# Patient Record
Sex: FEMALE | Race: WHITE | NOT HISPANIC OR LATINO | URBAN - METROPOLITAN AREA
[De-identification: names, ages, dates, MRNs, and addresses within clinical notes are randomized per-mention and may not be internally consistent; named-entity substitution may affect disease eponyms.]

---

## 2023-04-04 ENCOUNTER — TELEPHONE (OUTPATIENT)
Dept: TRANSPLANT | Facility: CLINIC | Age: 33
End: 2023-04-04

## 2023-04-04 NOTE — TELEPHONE ENCOUNTER
"Received email from Delmy Vegas stating that she is interested in donating to her friend, CRISTHIAN Lee. Screening questions sent & answers received (see below). Transplant donor referral episode opened.     : [birth year]  HT: 5'5"  WT: 138  BMI: 22.9  AGE: 33  EMAIL: [email or web address]    Relation/Recipient: Friend, Nuria Lee    HH sent on 22.       -----------------------------      LivingLiverTrans  ?  ?  ?  ?  ?    To:Delmy Vegas <[email or web address]>  Tue 2023 4:48 PM    Living Donor manual.pdf  328 KB  ?    LI Living Donor Health History.pdf  388 KB  ?    948 Living Liver Donor Informed Consent for Evaluation revised [email or web address]  90 KB  ?  3 attachments (807 KB)  ?  Save all to [facility] - [facility]  ?  Download all  Excellent. That info looks great. Thank you for your quick response!      Please find attached the following documents: a health history, the donor manual, the consent for evaluation. I encourage you to read over all of them & call with any questions your might have, but the only one I need returned is the Living Donor Health History. If we move forward with evaluation, you will sign the consent at that time.     Once completed, you may scan the health history form and return to this email address OR you may fax it to [phone], ATTN: Fede Gonzalez RN. As soon as I receive your health history, I will share it with our living donor MDs for review. They will let me know if we can proceed with a living liver donor evaluation.     Evaluation consists of 2-3 days of testing and consults at the [facility] on [facility]. Its very similar to the testing and consults done in a transplant recipient evaluation, and like the recipient, you will need to bring a caregiver to your evaluation (separate from your recipients caregiver). Once all test results are received and reviewed (including an imaging review process with our team and a team of " radiologists), your case is presented to the selection committee on a subsequent Wednesday. If you approved for living donor surgery, we will work with you and your recipient on an acceptable surgery date.      If you would like to watch an educational video about the benefits of living donation, please follow this link: [email or web address]     Also, here is the link for the Living Donor Assistance website. Its worth looking into for potential grants to defray gas/hotel costs if we do move forward with a donor evaluation. There are also a number of other resources available. https://www.livingdonorassistance.org/     I look forward to working with you in the future. Feel free to reach out with any questions you might have.       KADEN Marsh, RN, [facility]  Living Liver Donor Coordinator  Senior Clinical Liver Transplant Coordinator  [facility] [place]  Phone: [phone]  Fax: [phone]           From: Delmy Vegas <[email or web address]>  Sent: Tuesday, April 4, 2023 4:34 PM  To: LivingLiverTrans <[email or web address]>  Subject: [EXTERNAL] Re: Living Liver Donor      Hi Fede,    Thanks for getting back to me. Here's my info. Looking forward to speaking with you more.      1. [birth year]  2. 5'5  3. 138 lbs  4. Migraines  5. O+  6. Yes  7. Yes    Also, No coercion, payment etc. Just want to help if I can.    Delmy    On Tue, Apr 4, 2023 at 12:11 PM LivingLiverTrans <[email or web address]> wrote:  I can help you with that, Delmy. I will need your  to submit a separate email to me, though. Let's start with a few questions for you:     1.        What is your date of birth?  2.        Height?  3.        Weight?  4.        Any health issues (present or past)?  5.        What is your blood type?  6.        Are you able to take 6-8 weeks off to recover from surgery?  7.        Do you have health insurance?     The donor evaluation process is  a multi-step process starting with this screening. If everything screens OK, Ill have you fill out our detailed health history form to be reviewed by our team & then potentially schedule an evaluation if all looks well.      Donors must be between the ages of 18-50 (we sometimes consider folks between 50 & 55, but they must be in excellent health & our process is much more selective at that point). Donors must be willing and able to volunteer to be a donor without pressure, coercion, or payment. Donor will need to present to our clinic at main [facility] ([place]) for all evaluation testing & must be up to date with all age-appropriate cancer screening (pap smear, mammogram, PSA, colonoscopy, etc.). Testing usually takes two days & once the results are in, I present donor cases with the living donor team to the same selection committee that recipient cases go before to determine if listing for transplant is appropriate. If donors are approved, a date is chosen & surgery is scheduled. Surgery is roughly 8 hours of the day for the donor but can be shorter or longer. Donors should expect to spend a day post-op in ICU, 3-5 days on the stepdown unit & then ~2 weeks in the [place] area if they live more than an hour away. We support donors taking at least 6 weeks off for recovery as this is major surgery, and weight-lifting limits will be set low (~10lbs) for at least 6 months. Donors must agree to follow-up for 2 years (phone calls & labs) to remain compliant with protocols.     Each donor must have a caregiver plan separate from the recipients caregiver plan. The caregiver will need to attend the evaluation with the potential donor & be available to the team once donor is approved, be with the donor on surgery day & also stay with the donor post-op for a period of time.      If a recipient has multiple potential donors, the team & the donors will determine who will be worked up first as we are only able to  work up one donor at a time. Recipients must have donor coverage OR their insurance company must be open to the possibility of an agreement to work up a potential donor if coverage is not in place. (We will investigate this for all recipients as necessary.) The center cannot evaluate a donor or order testing until recipients are listed for transplant, but we can screen and receive health histories to review in the meantime.      I have attached a PDF of our donor manual to explain the process in detail. Please feel free to call [phone] (ask for the living LIVER donor coordinator or mention my name - Fede Gonzalez) with any questions you might have.    Fede Gonzalez, KATIEN, RN, [facility]  Living Liver Donor Coordinator  Senior Clinical Liver Transplant Coordinator  [facility] [place]  Phone: [phone]  Fax: [phone]           From: Delmy Vegas <[email or web address]>  Sent: Tuesday, April 4, 2023 1:46 AM  To: [email or web address] <[email or web address]>; [email or web address]>  Subject: [EXTERNAL] Living Liver Donor      Hello,    Both myself and my  are interested in starting the interview process to be screened as potential living liver donors for Nuriapradip Lee. How do we begin this process?    Thank you!    Katie Vegas    ?  Reply  ?  Forward

## 2023-04-05 ENCOUNTER — TELEPHONE (OUTPATIENT)
Dept: TRANSPLANT | Facility: CLINIC | Age: 33
End: 2023-04-05

## 2023-04-05 NOTE — TELEPHONE ENCOUNTER
Received health history form via email. History form and risk assessment uploaded to pt's media files.

## 2023-04-06 VITALS — BODY MASS INDEX: 22.99 KG/M2 | HEIGHT: 65 IN | WEIGHT: 138 LBS

## 2023-04-06 RX ORDER — FLUOXETINE 10 MG/1
5 TABLET ORAL DAILY
COMMUNITY

## 2023-04-06 RX ORDER — SUMATRIPTAN SUCCINATE 25 MG/1
25 TABLET ORAL
COMMUNITY

## 2023-04-06 RX ORDER — GABAPENTIN 300 MG/1
300 CAPSULE ORAL
COMMUNITY

## 2023-04-27 ENCOUNTER — TELEPHONE (OUTPATIENT)
Dept: TRANSPLANT | Facility: CLINIC | Age: 33
End: 2023-04-27

## 2023-04-27 NOTE — TELEPHONE ENCOUNTER
Email update sent re: donor case to Delmy. HH approved. Delmy can be considered as a potential donor for Nuria.    --------------------------------------------------    Dear Delmy,    I am reaching out to provide you with an update on your donor case for Nuria Lee.   The liver transplant team is still working on finalizing Ms. Lee' case. We apologize for the long wait, but we must be sure everything is in place prior to proceeding with any donor evaluations. While I cannot divulge any specific information due to privacy rules, I can tell you that we are working together with multiple entities/departments to coordinate her case.  In the meantime, I wanted to let you know that the living liver donor team has reviewed your health history form and we believe that you could potentially be a good donor for Ms. Lee. We have had a large number of people reach out to be screened for donation, and we are working on paring down the list to the most ideal candidates. Currently, you are on that list for consideration.  We are limited to evaluating one donor at a time due to insurance constraints. Once the case is finalized, we will look at our list of ideal candidates, you included, and choose the best donor candidate to start an evaluation. We are limited to evaluating one donor at a time due to insurance constraints.      I will keep you up to date as we know more about the situation, but in the meantime, I wanted to let you know where the case stands.   Thank you again for considering giving the gift of life. I look forward to working with you soon!      Fede Gonzalez, KATIEN, RN, [facility]  Living Liver Donor Coordinator  Senior Clinical Liver Transplant Coordinator  [facility] [place]  Phone: [phone]  Fax: [phone]

## 2023-05-25 ENCOUNTER — TELEPHONE (OUTPATIENT)
Dept: TRANSPLANT | Facility: CLINIC | Age: 33
End: 2023-05-25

## 2023-11-29 ENCOUNTER — APPOINTMENT (RX ONLY)
Dept: URBAN - METROPOLITAN AREA CLINIC 94 | Facility: CLINIC | Age: 33
Setting detail: DERMATOLOGY
End: 2023-11-29

## 2023-11-29 DIAGNOSIS — L70.0 ACNE VULGARIS: ICD-10-CM

## 2023-11-29 DIAGNOSIS — D18.0 HEMANGIOMA: ICD-10-CM

## 2023-11-29 DIAGNOSIS — Z80.8 FAMILY HISTORY OF MALIGNANT NEOPLASM OF OTHER ORGANS OR SYSTEMS: ICD-10-CM

## 2023-11-29 DIAGNOSIS — L81.4 OTHER MELANIN HYPERPIGMENTATION: ICD-10-CM

## 2023-11-29 DIAGNOSIS — L20.89 OTHER ATOPIC DERMATITIS: ICD-10-CM

## 2023-11-29 DIAGNOSIS — D22 MELANOCYTIC NEVI: ICD-10-CM

## 2023-11-29 DIAGNOSIS — L57.8 OTHER SKIN CHANGES DUE TO CHRONIC EXPOSURE TO NONIONIZING RADIATION: ICD-10-CM

## 2023-11-29 PROBLEM — D22.72 MELANOCYTIC NEVI OF LEFT LOWER LIMB, INCLUDING HIP: Status: ACTIVE | Noted: 2023-11-29

## 2023-11-29 PROBLEM — D18.01 HEMANGIOMA OF SKIN AND SUBCUTANEOUS TISSUE: Status: ACTIVE | Noted: 2023-11-29

## 2023-11-29 PROBLEM — D22.62 MELANOCYTIC NEVI OF LEFT UPPER LIMB, INCLUDING SHOULDER: Status: ACTIVE | Noted: 2023-11-29

## 2023-11-29 PROCEDURE — ? PRESCRIPTION

## 2023-11-29 PROCEDURE — 99214 OFFICE O/P EST MOD 30 MIN: CPT

## 2023-11-29 PROCEDURE — ? COUNSELING

## 2023-11-29 PROCEDURE — ? PRESCRIPTION MEDICATION MANAGEMENT

## 2023-11-29 RX ORDER — TRIAMCINOLONE ACETONIDE 1 MG/G
CREAM TOPICAL TWICE DAILY
Qty: 80 | Refills: 3 | Status: ERX | COMMUNITY
Start: 2023-11-29

## 2023-11-29 RX ADMIN — TRIAMCINOLONE ACETONIDE: 1 CREAM TOPICAL at 00:00

## 2023-11-29 ASSESSMENT — LOCATION DETAILED DESCRIPTION DERM
LOCATION DETAILED: MIDDLE STERNUM
LOCATION DETAILED: LEFT MEDIAL ZYGOMA
LOCATION DETAILED: LEFT ANTERIOR DISTAL THIGH
LOCATION DETAILED: RIGHT INFERIOR MEDIAL FOREHEAD
LOCATION DETAILED: LEFT SUPERIOR UPPER BACK
LOCATION DETAILED: STERNAL NOTCH
LOCATION DETAILED: RIGHT BUTTOCK
LOCATION DETAILED: RIGHT ANTERIOR PROXIMAL UPPER ARM
LOCATION DETAILED: LEFT POSTERIOR SHOULDER
LOCATION DETAILED: LEFT LATERAL PROXIMAL UPPER ARM

## 2023-11-29 ASSESSMENT — LOCATION SIMPLE DESCRIPTION DERM
LOCATION SIMPLE: LEFT UPPER BACK
LOCATION SIMPLE: LEFT THIGH
LOCATION SIMPLE: RIGHT BUTTOCK
LOCATION SIMPLE: RIGHT FOREHEAD
LOCATION SIMPLE: LEFT ZYGOMA
LOCATION SIMPLE: CHEST
LOCATION SIMPLE: RIGHT UPPER ARM
LOCATION SIMPLE: LEFT SHOULDER
LOCATION SIMPLE: LEFT UPPER ARM

## 2023-11-29 ASSESSMENT — LOCATION ZONE DERM
LOCATION ZONE: LEG
LOCATION ZONE: ARM
LOCATION ZONE: TRUNK
LOCATION ZONE: FACE

## 2023-11-29 NOTE — PROCEDURE: PRESCRIPTION MEDICATION MANAGEMENT
Initiate Treatment: triamcinolone acetonide 0.1 % topical cream Twice daily\\nQuantity: 80.0 g  Days Supply: 14\\nSig: Apply twice daily to chest x 1- 2 weeks on 2 weeks off for flares
Detail Level: Zone
Render In Strict Bullet Format?: No
Initiate Treatment: Written Rx given for Tretinoin 0.5% cream 20 grams 5 refills\\napply pea sized amount to entire face at bedtime as tolerated. can be drying and irritated.  not covered by insurance

## 2023-11-29 NOTE — PROCEDURE: COUNSELING
Detail Level: Detailed
Patient Specific Counseling (Will Not Stick From Patient To Patient): discussed removal is cosmetic at $250 for up to 12
Aklief Pregnancy And Lactation Text: It is unknown if this medication is safe to use during pregnancy. It is unknown if this medication is excreted in breast milk. Breastfeeding women should use the topical cream on the smallest area of the skin for the shortest time needed while breastfeeding. Do not apply to nipple and areola.
Tazorac Counseling:  Patient advised that medication is irritating and drying. Patient may need to apply sparingly and wash off after an hour before eventually leaving it on overnight. The patient verbalized understanding of the proper use and possible adverse effects of tazorac. All of the patient's questions and concerns were addressed.
Isotretinoin Counseling: Patient should get monthly blood tests, not donate blood, not drive at night if vision affected, not share medication, and not undergo elective surgery for 6 months after tx completed. Side effects reviewed, pt to contact office should one occur.
Birth Control Pills Pregnancy And Lactation Text: This medication should be avoided if pregnant and for the first 30 days post-partum.
Spironolactone Pregnancy And Lactation Text: This medication can cause feminization of the male fetus and should be avoided during pregnancy. The active metabolite is also found in breast milk.
Bactrim Pregnancy And Lactation Text: This medication is Pregnancy Category D and is known to cause fetal risk. It is also excreted in breast milk.
Minocycline Counseling: Patient advised regarding possible photosensitivity and discoloration of the teeth, skin, lips, tongue and gums. Patient instructed to avoid sunlight, if possible. When exposed to sunlight, patients should wear protective clothing, sunglasses, and sunscreen. The patient was instructed to call the office immediately if the following severe adverse effects occur:  hearing changes, easy bruising/bleeding, severe headache, or vision changes. The patient verbalized understanding of the proper use and possible adverse effects of minocycline. All of the patient's questions and concerns were addressed.
Tetracycline Pregnancy And Lactation Text: This medication is Pregnancy Category D and not consider safe during pregnancy. It is also excreted in breast milk.
Topical Sulfur Applications Counseling: Topical Sulfur Counseling: Patient counseled that this medication may cause skin irritation or allergic reactions. In the event of skin irritation, the patient was advised to reduce the amount of the drug applied or use it less frequently. The patient verbalized understanding of the proper use and possible adverse effects of topical sulfur application. All of the patient's questions and concerns were addressed.
Sarecycline Counseling: Patient advised regarding possible photosensitivity and discoloration of the teeth, skin, lips, tongue and gums. Patient instructed to avoid sunlight, if possible. When exposed to sunlight, patients should wear protective clothing, sunglasses, and sunscreen. The patient was instructed to call the office immediately if the following severe adverse effects occur:  hearing changes, easy bruising/bleeding, severe headache, or vision changes. The patient verbalized understanding of the proper use and possible adverse effects of sarecycline. All of the patient's questions and concerns were addressed.
Include Pregnancy/Lactation Warning?: No
Azelaic Acid Pregnancy And Lactation Text: This medication is considered safe during pregnancy and breast feeding.
Topical Clindamycin Counseling: Patient counseled that this medication may cause skin irritation or allergic reactions. In the event of skin irritation, the patient was advised to reduce the amount of the drug applied or use it less frequently. The patient verbalized understanding of the proper use and possible adverse effects of clindamycin. All of the patient's questions and concerns were addressed.
Dapsone Pregnancy And Lactation Text: This medication is Pregnancy Category C and is not considered safe during pregnancy or breast feeding.
High Dose Vitamin A Counseling: Side effects reviewed, pt to contact office should one occur.
Doxycycline Counseling:  Patient counseled regarding possible photosensitivity and increased risk for sunburn. Patient instructed to avoid sunlight, if possible. When exposed to sunlight, patients should wear protective clothing, sunglasses, and sunscreen. The patient was instructed to call the office immediately if the following severe adverse effects occur:  hearing changes, easy bruising/bleeding, severe headache, or vision changes. The patient verbalized understanding of the proper use and possible adverse effects of doxycycline. All of the patient's questions and concerns were addressed.
Benzoyl Peroxide Counseling: Patient counseled that medicine may cause skin irritation and bleach clothing. In the event of skin irritation, the patient was advised to reduce the amount of the drug applied or use it less frequently. The patient verbalized understanding of the proper use and possible adverse effects of benzoyl peroxide. All of the patient's questions and concerns were addressed.
Topical Sulfur Applications Pregnancy And Lactation Text: This medication is Pregnancy Category C and has an unknown safety profile during pregnancy. It is unknown if this topical medication is excreted in breast milk.
Erythromycin Counseling:  I discussed with the patient the risks of erythromycin including but not limited to GI upset, allergic reaction, drug rash, diarrhea, increase in liver enzymes, and yeast infections.
Winlevi Pregnancy And Lactation Text: This medication is considered safe during pregnancy and breastfeeding.
Azithromycin Pregnancy And Lactation Text: This medication is considered safe during pregnancy and is also secreted in breast milk.
Topical Retinoid counseling:  Patient advised to apply a pea-sized amount only at bedtime and wait 30 minutes after washing their face before applying. If too drying, patient may add a non-comedogenic moisturizer. The patient verbalized understanding of the proper use and possible adverse effects of retinoids. All of the patient's questions and concerns were addressed.
Dapsone Counseling: I discussed with the patient the risks of dapsone including but not limited to hemolytic anemia, agranulocytosis, rashes, methemoglobinemia, kidney failure, peripheral neuropathy, headaches, GI upset, and liver toxicity. Patients who start dapsone require monitoring including baseline LFTs and weekly CBCs for the first month, then every month thereafter. The patient verbalized understanding of the proper use and possible adverse effects of dapsone. All of the patient's questions and concerns were addressed.
Topical Clindamycin Pregnancy And Lactation Text: This medication is Pregnancy Category B and is considered safe during pregnancy. It is unknown if it is excreted in breast milk.
Isotretinoin Pregnancy And Lactation Text: This medication is Pregnancy Category X and is considered extremely dangerous during pregnancy. It is unknown if it is excreted in breast milk.
Tetracycline Counseling: Patient counseled regarding possible photosensitivity and increased risk for sunburn. Patient instructed to avoid sunlight, if possible. When exposed to sunlight, patients should wear protective clothing, sunglasses, and sunscreen. The patient was instructed to call the office immediately if the following severe adverse effects occur:  hearing changes, easy bruising/bleeding, severe headache, or vision changes. The patient verbalized understanding of the proper use and possible adverse effects of tetracycline. All of the patient's questions and concerns were addressed. Patient understands to avoid pregnancy while on therapy due to potential birth defects.
Bactrim Counseling:  I discussed with the patient the risks of sulfa antibiotics including but not limited to GI upset, allergic reaction, drug rash, diarrhea, dizziness, photosensitivity, and yeast infections. Rarely, more serious reactions can occur including but not limited to aplastic anemia, agranulocytosis, methemoglobinemia, blood dyscrasias, liver or kidney failure, lung infiltrates or desquamative/blistering drug rashes.
High Dose Vitamin A Pregnancy And Lactation Text: High dose vitamin A therapy is contraindicated during pregnancy and breast feeding.
Aklief counseling:  Patient advised to apply a pea-sized amount only at bedtime and wait 30 minutes after washing their face before applying. If too drying, patient may add a non-comedogenic moisturizer. The most commonly reported side effects including irritation, redness, scaling, dryness, stinging, burning, itching, and increased risk of sunburn. The patient verbalized understanding of the proper use and possible adverse effects of retinoids. All of the patient's questions and concerns were addressed.
Azelaic Acid Counseling: Patient counseled that medicine may cause skin irritation and to avoid applying near the eyes. In the event of skin irritation, the patient was advised to reduce the amount of the drug applied or use it less frequently. The patient verbalized understanding of the proper use and possible adverse effects of azelaic acid. All of the patient's questions and concerns were addressed.
Tazorac Pregnancy And Lactation Text: This medication is not safe during pregnancy. It is unknown if this medication is excreted in breast milk.
Winlevi Counseling:  I discussed with the patient the risks of topical clascoterone including but not limited to erythema, scaling, itching, and stinging. Patient voiced their understanding.
Doxycycline Pregnancy And Lactation Text: This medication is Pregnancy Category D and not consider safe during pregnancy. It is also excreted in breast milk but is considered safe for shorter treatment courses.
Spironolactone Counseling: Patient advised regarding risks of diarrhea, abdominal pain, hyperkalemia, birth defects (for female patients), liver toxicity and renal toxicity. The patient may need blood work to monitor liver and kidney function and potassium levels while on therapy. The patient verbalized understanding of the proper use and possible adverse effects of spironolactone. All of the patient's questions and concerns were addressed.
Benzoyl Peroxide Pregnancy And Lactation Text: This medication is Pregnancy Category C. It is unknown if benzoyl peroxide is excreted in breast milk.
Azithromycin Counseling:  I discussed with the patient the risks of azithromycin including but not limited to GI upset, allergic reaction, drug rash, diarrhea, and yeast infections.
Birth Control Pills Counseling: Birth Control Pill Counseling: I discussed with the patient the potential side effects of OCPs including but not limited to increased risk of stroke, heart attack, thrombophlebitis, deep venous thrombosis, hepatic adenomas, breast changes, GI upset, headaches, and depression. The patient verbalized understanding of the proper use and possible adverse effects of OCPs. All of the patient's questions and concerns were addressed.
Erythromycin Pregnancy And Lactation Text: This medication is Pregnancy Category B and is considered safe during pregnancy. It is also excreted in breast milk.
Topical Retinoid Pregnancy And Lactation Text: This medication is Pregnancy Category C. It is unknown if this medication is excreted in breast milk.
Detail Level: Simple
Patient Specific Counseling (Will Not Stick From Patient To Patient): -----------------------Matt [place] rx tretinoin cream nightly as tolerated

## 2023-12-20 ENCOUNTER — TELEPHONE (OUTPATIENT)
Dept: TRANSPLANT | Facility: CLINIC | Age: 33
End: 2023-12-20

## 2023-12-20 NOTE — TELEPHONE ENCOUNTER
Called patient to inform her that we are proceeding with potential donors for an evaluation for Nuria. Delmy is still interested in being a potential living donor.

## 2023-12-22 ENCOUNTER — TELEPHONE (OUTPATIENT)
Dept: TRANSPLANT | Facility: CLINIC | Age: 33
End: 2023-12-22

## 2024-08-07 ENCOUNTER — APPOINTMENT (RX ONLY)
Dept: URBAN - METROPOLITAN AREA CLINIC 94 | Facility: CLINIC | Age: 34
Setting detail: DERMATOLOGY
End: 2024-08-07

## 2024-08-07 DIAGNOSIS — Z80.8 FAMILY HISTORY OF MALIGNANT NEOPLASM OF OTHER ORGANS OR SYSTEMS: ICD-10-CM

## 2024-08-07 DIAGNOSIS — L21.8 OTHER SEBORRHEIC DERMATITIS: ICD-10-CM | Status: INADEQUATELY CONTROLLED

## 2024-08-07 DIAGNOSIS — L20.89 OTHER ATOPIC DERMATITIS: ICD-10-CM

## 2024-08-07 PROCEDURE — 99214 OFFICE O/P EST MOD 30 MIN: CPT

## 2024-08-07 PROCEDURE — ? PRESCRIPTION MEDICATION MANAGEMENT

## 2024-08-07 PROCEDURE — ? PRESCRIPTION

## 2024-08-07 PROCEDURE — ? COUNSELING

## 2024-08-07 RX ORDER — CLOBETASOL PROPIONATE 0.5 MG/ML
SOLUTION TOPICAL
Qty: 50 | Refills: 2 | Status: ERX | COMMUNITY
Start: 2024-08-07

## 2024-08-07 RX ORDER — CICLOPIROX 10 MG/.96ML
SHAMPOO TOPICAL
Qty: 120 | Refills: 5 | Status: ERX | COMMUNITY
Start: 2024-08-07

## 2024-08-07 RX ADMIN — CICLOPIROX: 10 SHAMPOO TOPICAL at 00:00

## 2024-08-07 RX ADMIN — CLOBETASOL PROPIONATE: 0.5 SOLUTION TOPICAL at 00:00

## 2024-08-07 ASSESSMENT — LOCATION DETAILED DESCRIPTION DERM
LOCATION DETAILED: POSTERIOR MID-PARIETAL SCALP
LOCATION DETAILED: STERNAL NOTCH

## 2024-08-07 ASSESSMENT — LOCATION ZONE DERM
LOCATION ZONE: SCALP
LOCATION ZONE: TRUNK

## 2024-08-07 ASSESSMENT — LOCATION SIMPLE DESCRIPTION DERM
LOCATION SIMPLE: CHEST
LOCATION SIMPLE: POSTERIOR SCALP

## 2024-08-07 NOTE — PROCEDURE: PRESCRIPTION MEDICATION MANAGEMENT
Continue Regimen: triamcinolone acetonide 0.1 % topical cream Twice daily\\nQuantity: 80.0 g  Days Supply: 14\\nSig: Apply twice daily to chest x 1- 2 weeks on 2 weeks off for flares
Detail Level: Zone
Render In Strict Bullet Format?: No

## 2024-08-21 ENCOUNTER — TELEPHONE (OUTPATIENT)
Dept: TRANSPLANT | Facility: CLINIC | Age: 34
End: 2024-08-21

## 2025-04-28 RX ORDER — CLOBETASOL PROPIONATE 0.5 MG/ML
SOLUTION TOPICAL
Qty: 50 | Refills: 2 | Status: ERX

## 2025-04-28 RX ORDER — CICLOPIROX 10 MG/.96ML
SHAMPOO TOPICAL
Qty: 120 | Refills: 2 | Status: ERX